# Patient Record
Sex: MALE | Race: WHITE | HISPANIC OR LATINO | Employment: FULL TIME | URBAN - METROPOLITAN AREA
[De-identification: names, ages, dates, MRNs, and addresses within clinical notes are randomized per-mention and may not be internally consistent; named-entity substitution may affect disease eponyms.]

---

## 2024-07-08 ENCOUNTER — HOSPITAL ENCOUNTER (INPATIENT)
Facility: HOSPITAL | Age: 29
LOS: 1 days | Discharge: HOME | DRG: 683 | End: 2024-07-09
Attending: HOSPITALIST | Admitting: HOSPITALIST

## 2024-07-08 DIAGNOSIS — M62.82 NON-TRAUMATIC RHABDOMYOLYSIS: ICD-10-CM

## 2024-07-08 DIAGNOSIS — N17.9 AKI (ACUTE KIDNEY INJURY): Primary | ICD-10-CM

## 2024-07-08 DIAGNOSIS — N17.9 ACUTE KIDNEY INJURY: ICD-10-CM

## 2024-07-08 DIAGNOSIS — R07.9 CHEST PAIN: ICD-10-CM

## 2024-07-08 LAB
ALBUMIN SERPL BCP-MCNC: 5.4 G/DL (ref 3.5–5.2)
ALP SERPL-CCNC: 208 U/L (ref 55–135)
ALT SERPL W/O P-5'-P-CCNC: 55 U/L (ref 10–44)
ANION GAP SERPL CALC-SCNC: 21 MMOL/L (ref 8–16)
AST SERPL-CCNC: 37 U/L (ref 10–40)
BASOPHILS # BLD AUTO: 0.1 K/UL (ref 0–0.2)
BASOPHILS NFR BLD: 0.5 % (ref 0–1.9)
BILIRUB SERPL-MCNC: 0.9 MG/DL (ref 0.1–1)
BUN SERPL-MCNC: 22 MG/DL (ref 6–20)
CALCIUM SERPL-MCNC: 11.3 MG/DL (ref 8.7–10.5)
CHLORIDE SERPL-SCNC: 99 MMOL/L (ref 95–110)
CK SERPL-CCNC: 529 U/L (ref 20–200)
CO2 SERPL-SCNC: 16 MMOL/L (ref 23–29)
CREAT SERPL-MCNC: 3.3 MG/DL (ref 0.5–1.4)
DIFFERENTIAL METHOD BLD: ABNORMAL
EOSINOPHIL # BLD AUTO: 0 K/UL (ref 0–0.5)
EOSINOPHIL NFR BLD: 0.1 % (ref 0–8)
ERYTHROCYTE [DISTWIDTH] IN BLOOD BY AUTOMATED COUNT: 13.2 % (ref 11.5–14.5)
EST. GFR  (NO RACE VARIABLE): 25 ML/MIN/1.73 M^2
GLUCOSE SERPL-MCNC: 115 MG/DL (ref 70–110)
HCT VFR BLD AUTO: 51.9 % (ref 40–54)
HCV AB SERPL QL IA: NEGATIVE
HEP C VIRUS HOLD SPECIMEN: NORMAL
HGB BLD-MCNC: 18 G/DL (ref 14–18)
HIV 1+2 AB+HIV1 P24 AG SERPL QL IA: NEGATIVE
IMM GRANULOCYTES # BLD AUTO: 0.13 K/UL (ref 0–0.04)
IMM GRANULOCYTES NFR BLD AUTO: 0.7 % (ref 0–0.5)
LYMPHOCYTES # BLD AUTO: 2.9 K/UL (ref 1–4.8)
LYMPHOCYTES NFR BLD: 14.9 % (ref 18–48)
MCH RBC QN AUTO: 27.2 PG (ref 27–31)
MCHC RBC AUTO-ENTMCNC: 34.7 G/DL (ref 32–36)
MCV RBC AUTO: 79 FL (ref 82–98)
MONOCYTES # BLD AUTO: 0.9 K/UL (ref 0.3–1)
MONOCYTES NFR BLD: 4.6 % (ref 4–15)
NEUTROPHILS # BLD AUTO: 15.5 K/UL (ref 1.8–7.7)
NEUTROPHILS NFR BLD: 79.2 % (ref 38–73)
NRBC BLD-RTO: 0 /100 WBC
PLATELET # BLD AUTO: 382 K/UL (ref 150–450)
PMV BLD AUTO: 9.2 FL (ref 9.2–12.9)
POTASSIUM SERPL-SCNC: 4.5 MMOL/L (ref 3.5–5.1)
PROT SERPL-MCNC: 10.3 G/DL (ref 6–8.4)
RBC # BLD AUTO: 6.61 M/UL (ref 4.6–6.2)
SODIUM SERPL-SCNC: 136 MMOL/L (ref 136–145)
WBC # BLD AUTO: 19.52 K/UL (ref 3.9–12.7)

## 2024-07-08 PROCEDURE — 11000001 HC ACUTE MED/SURG PRIVATE ROOM

## 2024-07-08 PROCEDURE — 96360 HYDRATION IV INFUSION INIT: CPT

## 2024-07-08 PROCEDURE — 25000003 PHARM REV CODE 250: Performed by: NURSE PRACTITIONER

## 2024-07-08 PROCEDURE — 85025 COMPLETE CBC W/AUTO DIFF WBC: CPT | Performed by: NURSE PRACTITIONER

## 2024-07-08 PROCEDURE — 86803 HEPATITIS C AB TEST: CPT | Performed by: EMERGENCY MEDICINE

## 2024-07-08 PROCEDURE — 99285 EMERGENCY DEPT VISIT HI MDM: CPT | Mod: 25

## 2024-07-08 PROCEDURE — 80053 COMPREHEN METABOLIC PANEL: CPT | Performed by: NURSE PRACTITIONER

## 2024-07-08 PROCEDURE — 87389 HIV-1 AG W/HIV-1&-2 AB AG IA: CPT | Performed by: EMERGENCY MEDICINE

## 2024-07-08 PROCEDURE — 82550 ASSAY OF CK (CPK): CPT | Performed by: NURSE PRACTITIONER

## 2024-07-08 RX ORDER — ALUMINUM HYDROXIDE, MAGNESIUM HYDROXIDE, AND SIMETHICONE 1200; 120; 1200 MG/30ML; MG/30ML; MG/30ML
30 SUSPENSION ORAL 4 TIMES DAILY PRN
Status: DISCONTINUED | OUTPATIENT
Start: 2024-07-09 | End: 2024-07-09 | Stop reason: HOSPADM

## 2024-07-08 RX ORDER — IBUPROFEN 200 MG
24 TABLET ORAL
Status: DISCONTINUED | OUTPATIENT
Start: 2024-07-09 | End: 2024-07-09 | Stop reason: HOSPADM

## 2024-07-08 RX ORDER — ONDANSETRON HYDROCHLORIDE 2 MG/ML
4 INJECTION, SOLUTION INTRAVENOUS
Status: ACTIVE | OUTPATIENT
Start: 2024-07-08 | End: 2024-07-09

## 2024-07-08 RX ORDER — IBUPROFEN 200 MG
16 TABLET ORAL
Status: DISCONTINUED | OUTPATIENT
Start: 2024-07-09 | End: 2024-07-09 | Stop reason: HOSPADM

## 2024-07-08 RX ORDER — ACETAMINOPHEN 650 MG/1
650 SUPPOSITORY RECTAL EVERY 4 HOURS PRN
Status: DISCONTINUED | OUTPATIENT
Start: 2024-07-09 | End: 2024-07-09 | Stop reason: HOSPADM

## 2024-07-08 RX ORDER — ONDANSETRON HYDROCHLORIDE 2 MG/ML
4 INJECTION, SOLUTION INTRAVENOUS EVERY 8 HOURS PRN
Status: DISCONTINUED | OUTPATIENT
Start: 2024-07-09 | End: 2024-07-09 | Stop reason: HOSPADM

## 2024-07-08 RX ORDER — GLUCAGON 1 MG
1 KIT INJECTION
Status: DISCONTINUED | OUTPATIENT
Start: 2024-07-09 | End: 2024-07-09 | Stop reason: HOSPADM

## 2024-07-08 RX ORDER — SODIUM CHLORIDE 9 MG/ML
INJECTION, SOLUTION INTRAVENOUS CONTINUOUS
Status: DISCONTINUED | OUTPATIENT
Start: 2024-07-08 | End: 2024-07-09 | Stop reason: HOSPADM

## 2024-07-08 RX ORDER — SODIUM CHLORIDE 0.9 % (FLUSH) 0.9 %
3 SYRINGE (ML) INJECTION EVERY 12 HOURS PRN
Status: DISCONTINUED | OUTPATIENT
Start: 2024-07-09 | End: 2024-07-09 | Stop reason: HOSPADM

## 2024-07-08 RX ORDER — ACETAMINOPHEN 325 MG/1
650 TABLET ORAL EVERY 8 HOURS PRN
Status: DISCONTINUED | OUTPATIENT
Start: 2024-07-09 | End: 2024-07-09 | Stop reason: HOSPADM

## 2024-07-08 RX ORDER — SIMETHICONE 80 MG
1 TABLET,CHEWABLE ORAL 4 TIMES DAILY PRN
Status: DISCONTINUED | OUTPATIENT
Start: 2024-07-09 | End: 2024-07-09 | Stop reason: HOSPADM

## 2024-07-08 RX ORDER — TALC
6 POWDER (GRAM) TOPICAL NIGHTLY PRN
Status: DISCONTINUED | OUTPATIENT
Start: 2024-07-09 | End: 2024-07-09 | Stop reason: HOSPADM

## 2024-07-08 RX ORDER — PROMETHAZINE HYDROCHLORIDE 25 MG/1
25 TABLET ORAL EVERY 6 HOURS PRN
Status: DISCONTINUED | OUTPATIENT
Start: 2024-07-09 | End: 2024-07-09 | Stop reason: HOSPADM

## 2024-07-08 RX ORDER — NALOXONE HCL 0.4 MG/ML
0.02 VIAL (ML) INJECTION
Status: DISCONTINUED | OUTPATIENT
Start: 2024-07-09 | End: 2024-07-09 | Stop reason: HOSPADM

## 2024-07-08 RX ADMIN — SODIUM CHLORIDE 2000 ML: 9 INJECTION, SOLUTION INTRAVENOUS at 10:07

## 2024-07-08 RX ADMIN — SODIUM CHLORIDE 1000 ML: 9 INJECTION, SOLUTION INTRAVENOUS at 09:07

## 2024-07-08 NOTE — Clinical Note
Diagnosis: Acute kidney injury [858645]   Future Attending Provider: MEGHANN BARRY [[id number]]   Reason for IP Medical Treatment  (Clinical interventions that can only be accomplished in the IP setting? ) :: iv fluids, labs   I certify that Inpatient services for greater than or equal to 2 midnights are medically necessary:: Yes   Plans for Post-Acute care--if anticipated (pick the single best option):: A. No post acute care anticipated at this time

## 2024-07-09 VITALS
DIASTOLIC BLOOD PRESSURE: 63 MMHG | OXYGEN SATURATION: 98 % | WEIGHT: 169.75 LBS | RESPIRATION RATE: 17 BRPM | SYSTOLIC BLOOD PRESSURE: 100 MMHG | HEART RATE: 76 BPM | TEMPERATURE: 98 F

## 2024-07-09 PROBLEM — R25.2 MUSCLE CRAMPS: Status: ACTIVE | Noted: 2024-07-09

## 2024-07-09 PROBLEM — N17.9 AKI (ACUTE KIDNEY INJURY): Status: ACTIVE | Noted: 2024-07-09

## 2024-07-09 PROBLEM — M62.82 NON-TRAUMATIC RHABDOMYOLYSIS: Status: ACTIVE | Noted: 2024-07-09

## 2024-07-09 PROBLEM — R74.8 ELEVATED CPK: Status: ACTIVE | Noted: 2024-07-09

## 2024-07-09 PROBLEM — T67.5XXA HEAT EXHAUSTION: Status: ACTIVE | Noted: 2024-07-09

## 2024-07-09 PROBLEM — D72.829 LEUKOCYTOSIS: Status: ACTIVE | Noted: 2024-07-09

## 2024-07-09 LAB
ALBUMIN SERPL BCP-MCNC: 3.3 G/DL (ref 3.5–5.2)
ALP SERPL-CCNC: 126 U/L (ref 55–135)
ALT SERPL W/O P-5'-P-CCNC: 33 U/L (ref 10–44)
ANION GAP SERPL CALC-SCNC: 11 MMOL/L (ref 8–16)
AST SERPL-CCNC: 25 U/L (ref 10–40)
BASOPHILS # BLD AUTO: 0.05 K/UL (ref 0–0.2)
BASOPHILS NFR BLD: 0.5 % (ref 0–1.9)
BILIRUB SERPL-MCNC: 0.8 MG/DL (ref 0.1–1)
BUN SERPL-MCNC: 22 MG/DL (ref 6–20)
CALCIUM SERPL-MCNC: 8.3 MG/DL (ref 8.7–10.5)
CHLORIDE SERPL-SCNC: 110 MMOL/L (ref 95–110)
CO2 SERPL-SCNC: 18 MMOL/L (ref 23–29)
CREAT SERPL-MCNC: 1.3 MG/DL (ref 0.5–1.4)
DIFFERENTIAL METHOD BLD: ABNORMAL
EOSINOPHIL # BLD AUTO: 0.1 K/UL (ref 0–0.5)
EOSINOPHIL NFR BLD: 0.6 % (ref 0–8)
ERYTHROCYTE [DISTWIDTH] IN BLOOD BY AUTOMATED COUNT: 12.9 % (ref 11.5–14.5)
EST. GFR  (NO RACE VARIABLE): >60 ML/MIN/1.73 M^2
GLUCOSE SERPL-MCNC: 102 MG/DL (ref 70–110)
HCT VFR BLD AUTO: 40.1 % (ref 40–54)
HGB BLD-MCNC: 13.5 G/DL (ref 14–18)
IMM GRANULOCYTES # BLD AUTO: 0.05 K/UL (ref 0–0.04)
IMM GRANULOCYTES NFR BLD AUTO: 0.5 % (ref 0–0.5)
LYMPHOCYTES # BLD AUTO: 3.1 K/UL (ref 1–4.8)
LYMPHOCYTES NFR BLD: 28.7 % (ref 18–48)
MAGNESIUM SERPL-MCNC: 2.4 MG/DL (ref 1.6–2.6)
MCH RBC QN AUTO: 27.6 PG (ref 27–31)
MCHC RBC AUTO-ENTMCNC: 33.7 G/DL (ref 32–36)
MCV RBC AUTO: 82 FL (ref 82–98)
MONOCYTES # BLD AUTO: 0.9 K/UL (ref 0.3–1)
MONOCYTES NFR BLD: 8.5 % (ref 4–15)
NEUTROPHILS # BLD AUTO: 6.5 K/UL (ref 1.8–7.7)
NEUTROPHILS NFR BLD: 61.2 % (ref 38–73)
NRBC BLD-RTO: 0 /100 WBC
PLATELET # BLD AUTO: 262 K/UL (ref 150–450)
PMV BLD AUTO: 9.4 FL (ref 9.2–12.9)
POTASSIUM SERPL-SCNC: 3.7 MMOL/L (ref 3.5–5.1)
PROT SERPL-MCNC: 6.1 G/DL (ref 6–8.4)
RBC # BLD AUTO: 4.9 M/UL (ref 4.6–6.2)
SODIUM SERPL-SCNC: 139 MMOL/L (ref 136–145)
WBC # BLD AUTO: 10.62 K/UL (ref 3.9–12.7)

## 2024-07-09 PROCEDURE — 80053 COMPREHEN METABOLIC PANEL: CPT | Performed by: NURSE PRACTITIONER

## 2024-07-09 PROCEDURE — 25000003 PHARM REV CODE 250: Performed by: NURSE PRACTITIONER

## 2024-07-09 PROCEDURE — 36415 COLL VENOUS BLD VENIPUNCTURE: CPT | Performed by: NURSE PRACTITIONER

## 2024-07-09 PROCEDURE — 83735 ASSAY OF MAGNESIUM: CPT | Performed by: HOSPITALIST

## 2024-07-09 PROCEDURE — 85025 COMPLETE CBC W/AUTO DIFF WBC: CPT | Performed by: NURSE PRACTITIONER

## 2024-07-09 RX ORDER — IBUPROFEN 600 MG/1
600 TABLET ORAL EVERY 8 HOURS PRN
Status: ON HOLD | COMMUNITY
End: 2024-07-09 | Stop reason: HOSPADM

## 2024-07-09 RX ADMIN — SODIUM CHLORIDE: 9 INJECTION, SOLUTION INTRAVENOUS at 12:07

## 2024-07-09 RX ADMIN — SODIUM CHLORIDE: 9 INJECTION, SOLUTION INTRAVENOUS at 07:07

## 2024-07-09 NOTE — ASSESSMENT & PLAN NOTE
Patient presented with diffuse body aches/cramping following working outdoors and found to have evidence concerning for dehydration and rhabdomyolysis given presence of YOGI with Cr/GFR measuring 3.3/25 and elevated .  Patient initiated on IVFs with repeat a.m. labs pending. Will continue to monitor.  Plan:  -continue IVFs  -trend CPK level  -monitor and replete electrolytes as needed

## 2024-07-09 NOTE — PLAN OF CARE
[facility]  Discharge Final Note    Primary Care Provider: Lashon Primary Doctor    Expected Discharge Date: 7/9/2024    Final Discharge Note (most recent)       Final Note - 07/09/24 0934          Final Note    Assessment Type Final Discharge Note     Anticipated Discharge Disposition Home or Self Care     Hospital Resources/Appts/Education Provided --   [facility] referral [facility] to [facility]                             Contact Info       No, Primary Doctor   Relationship: PCP - General        Next Steps: Follow up in 3 day(s)

## 2024-07-09 NOTE — NURSING
Discharge instructions in Thai, written and verbal, given to patient an significant other. Teach back complete.

## 2024-07-09 NOTE — H&P
[facility] Medicine  History & Physical    Patient Name: Yousuf aT  MRN: [id number]  Patient Class: IP- Inpatient  Admission Date: 7/8/2024  Attending Physician: Willy Reyes MD   Primary Care Provider: Lashon Primary Doctor         Patient information was obtained from patient, past medical records, and ER records.     Subjective:     Principal Problem:Muscle cramps    Chief Complaint:   Chief Complaint   Patient presents with    Cramping     Body cramps after working in the heat        HPI: Yousuf Ta is a 28 y.o. male with a PMH  has no past medical history on file. who presented to the ED for worsening generalized body aches/weakness and muscles cramps x1 day duration.  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  Patient does report feeling dehydrated and sweating a lot and reported taking no prescribed or over-the-counter medications prior to arrival.  He denied exposure to recent ill contacts, any significant past medical history, and reported being in his usual state of health prior to onset of symptoms.  All other review of systems negative except as noted above.  Initial workup in the ED revealed patient to be afebrile with leukocytosis measuring 19.52, bicarb 16, anion gap 21, Cr/GFR 3.3/25, and  concerning for rhabdomyolysis and dehydration.  Patient admitted to Hospital Medicine inpatient for continued medical management.    PCP: Lashon, Primary Doctor      No past medical history on file.    No past surgical history on file.    Review of patient's allergies indicates:  No Known Allergies    No current facility-administered medications on file prior to encounter.     No current outpatient medications on file prior to encounter.     Family History    None       Tobacco Use    Smoking status: Not on file    Smokeless tobacco: Not on file   Substance and Sexual  Activity    Alcohol use: Not on file    Drug use: Not on file    Sexual activity: Not on file     Review of Systems   All other systems reviewed and are negative.    Objective:     Vital Signs (Most Recent):  Temp: 98.4 °F (36.9 °C) (07/09/24 0132)  Pulse: 97 (07/09/24 0214)  Resp: 20 (07/09/24 0132)  BP: (!) 130/90 (07/09/24 0132)  SpO2: 100 % (07/09/24 0132) Vital Signs (24h Range):  Temp:  [97.7 °F (36.5 °C)-98.4 °F (36.9 °C)] 98.4 °F (36.9 °C)  Pulse:  [] 97  Resp:  [18-20] 20  SpO2:  [95 %-100 %] 100 %  BP: (117-139)/(58-90) 130/90     Weight: 77 kg (169 lb 12.1 oz)  There is no height or weight on file to calculate BMI.     Physical Exam  Vitals reviewed.   Constitutional:       General: He is not in acute distress.     Appearance: He is obese. He is ill-appearing. He is not toxic-appearing or diaphoretic.      Comments: Ill-appearing but nontoxic   HENT:      Head: Normocephalic and atraumatic.      Right Ear: External ear normal.      Left Ear: External ear normal.      Nose: Nose normal. No congestion or rhinorrhea.      Mouth/Throat:      Mouth: Mucous membranes are moist.      Pharynx: Oropharynx is clear. No oropharyngeal exudate or posterior oropharyngeal erythema.   Eyes:      General: No scleral icterus.     Extraocular Movements: Extraocular movements intact.      Conjunctiva/sclera: Conjunctivae normal.      Pupils: Pupils are equal, round, and reactive to light.   Neck:      Vascular: No carotid bruit.   Cardiovascular:      Rate and Rhythm: Normal rate and regular rhythm.      Pulses: Normal pulses.      Heart sounds: Normal heart sounds. No murmur heard.     No friction rub. No gallop.   Pulmonary:      Effort: Pulmonary effort is normal. No respiratory distress.      Breath sounds: Normal breath sounds. No stridor. No wheezing, rhonchi or rales.   Chest:      Chest wall: No tenderness.   Abdominal:      General: Abdomen is flat. Bowel sounds are normal. There is no distension.       Palpations: Abdomen is soft. There is no mass.      Tenderness: There is no abdominal tenderness. There is no guarding or rebound.      Hernia: No hernia is present.   Musculoskeletal:         General: No swelling, tenderness, deformity or signs of injury. Normal range of motion.      Cervical back: Normal range of motion and neck supple. No rigidity or tenderness.   Lymphadenopathy:      Cervical: No cervical adenopathy.   Skin:     General: Skin is warm and dry.      Capillary Refill: Capillary refill takes less than 2 seconds.      Coloration: Skin is not jaundiced or pale.      Findings: No bruising, erythema, lesion or rash.   Neurological:      General: No focal deficit present.      Mental Status: He is alert and oriented to person, place, and time. Mental status is at baseline.      Cranial Nerves: No cranial nerve deficit.      Sensory: No sensory deficit.      Motor: No weakness.      Coordination: Coordination normal.   Psychiatric:         Mood and Affect: Mood normal.         Behavior: Behavior normal.         Thought Content: Thought content normal.         Judgment: Judgment normal.              CRANIAL NERVES     CN III, IV, VI   Pupils are equal, round, and reactive to light.       Significant Labs: All pertinent labs within the past 24 hours have been reviewed.    Significant Imaging: I have reviewed all pertinent imaging results/findings within the past 24 hours.    LABS:  Recent Results (from the past 24 hour(s))   HIV 1/2 Ag/Ab (4th Gen)    Collection Time: 07/08/24  9:33 PM   Result Value Ref Range    HIV 1/2 Ag/Ab Negative Negative   Hepatitis C Antibody    Collection Time: 07/08/24  9:33 PM   Result Value Ref Range    Hepatitis C Ab Negative Negative   HCV Virus Hold Specimen    Collection Time: 07/08/24  9:33 PM   Result Value Ref Range    HEP C Virus Hold Specimen Hold for HCV sendout    CBC auto differential    Collection Time: 07/08/24  9:33 PM   Result Value Ref Range    WBC 19.52 (H)  3.90 - 12.70 K/uL    RBC 6.61 (H) 4.60 - 6.20 M/uL    Hemoglobin 18.0 14.0 - 18.0 g/dL    Hematocrit 51.9 40.0 - 54.0 %    MCV 79 (L) 82 - 98 fL    MCH 27.2 27.0 - 31.0 pg    MCHC 34.7 32.0 - 36.0 g/dL    RDW 13.2 11.5 - 14.5 %    Platelets 382 150 - 450 K/uL    MPV 9.2 9.2 - 12.9 fL    Immature Granulocytes 0.7 (H) 0.0 - 0.5 %    Gran # (ANC) 15.5 (H) 1.8 - 7.7 K/uL    Immature Grans (Abs) 0.13 (H) 0.00 - 0.04 K/uL    Lymph # 2.9 1.0 - 4.8 K/uL    Mono # 0.9 0.3 - 1.0 K/uL    Eos # 0.0 0.0 - 0.5 K/uL    Baso # 0.10 0.00 - 0.20 K/uL    nRBC 0 0 /100 WBC    Gran % 79.2 (H) 38.0 - 73.0 %    Lymph % 14.9 (L) 18.0 - 48.0 %    Mono % 4.6 4.0 - 15.0 %    Eosinophil % 0.1 0.0 - 8.0 %    Basophil % 0.5 0.0 - 1.9 %    Differential Method Automated    Comprehensive metabolic panel    Collection Time: 07/08/24  9:33 PM   Result Value Ref Range    Sodium 136 136 - 145 mmol/L    Potassium 4.5 3.5 - 5.1 mmol/L    Chloride 99 95 - 110 mmol/L    CO2 16 (L) 23 - 29 mmol/L    Glucose 115 (H) 70 - 110 mg/dL    BUN 22 (H) 6 - 20 mg/dL    Creatinine 3.3 (H) 0.5 - 1.4 mg/dL    Calcium 11.3 (H) 8.7 - 10.5 mg/dL    Total Protein 10.3 (H) 6.0 - 8.4 g/dL    Albumin 5.4 (H) 3.5 - 5.2 g/dL    Total Bilirubin 0.9 0.1 - 1.0 mg/dL    Alkaline Phosphatase 208 (H) 55 - 135 U/L    AST 37 10 - 40 U/L    ALT 55 (H) 10 - 44 U/L    eGFR 25 (A) >60 mL/min/1.73 m^2    Anion Gap 21 (H) 8 - 16 mmol/L   CPK    Collection Time: 07/08/24  9:33 PM   Result Value Ref Range     (H) 20 - 200 U/L       RADIOLOGY  No results found.    EKG    MICROBIOLOGY    MDM    Assessment/Plan:     * Muscle cramps    Heat exhaustion    Non-traumatic rhabdomyolysis    Elevated CPK  Patient presented with diffuse body aches/cramping following working outdoors and found to have evidence concerning for dehydration and rhabdomyolysis given presence of YOGI with Cr/GFR measuring 3.3/25 and elevated .  Patient initiated on IVFs with repeat a.m. labs pending. Will continue  to monitor.  Plan:  -continue IVFs  -trend CPK level  -monitor and replete electrolytes as needed      YOGI (acute kidney injury)  Patient with acute kidney injury/acute renal failure likely due to pre-renal azotemia due to dehydration YOGI is currently  undergoing medical management . Baseline creatinine unknown - Labs reviewed- Renal function/electrolytes with CrCl cannot be calculated (Unknown ideal weight.). according to latest data. Monitor urine output and serial BMP and adjust therapy as needed. Avoid nephrotoxins and renally dose meds for GFR listed above.      Leukocytosis  Patient found to have elevated WBC measuring 19.52 in absence of active infectious processes and likely reactive in nature given underlying dehydration/rhabdomyolysis as noted above.  Patient remains afebrile in initiated on IVFs.  Plan:  -continue IVFs  -monitor CBC and fever curve  -will hold antibiotics for time being        VTE Risk Mitigation (From admission, onward)           Ordered     IP VTE LOW RISK PATIENT  Once         07/08/24 2305     Place sequential compression device  Until discontinued         07/08/24 2305                  //Core Measures   -DVT proph: SCDs, Lovenox   -Code status: Full    -Surrogate: none provided       Components of this note were documented using a voice recognition system and are subject to errors not corrected at the time the document was proof read. Please contact the author for any clarifications.        Willy Reyes MD  Department of Hospital Medicine  [facility]

## 2024-07-09 NOTE — SUBJECTIVE & OBJECTIVE
No past medical history on file.    No past surgical history on file.    Review of patient's allergies indicates:  No Known Allergies    No current facility-administered medications on file prior to encounter.     No current outpatient medications on file prior to encounter.     Family History    None       Tobacco Use    Smoking status: Not on file    Smokeless tobacco: Not on file   Substance and Sexual Activity    Alcohol use: Not on file    Drug use: Not on file    Sexual activity: Not on file     Review of Systems   All other systems reviewed and are negative.    Objective:     Vital Signs (Most Recent):  Temp: 98.4 °F (36.9 °C) (07/09/24 0132)  Pulse: 97 (07/09/24 0214)  Resp: 20 (07/09/24 0132)  BP: (!) 130/90 (07/09/24 0132)  SpO2: 100 % (07/09/24 0132) Vital Signs (24h Range):  Temp:  [97.7 °F (36.5 °C)-98.4 °F (36.9 °C)] 98.4 °F (36.9 °C)  Pulse:  [] 97  Resp:  [18-20] 20  SpO2:  [95 %-100 %] 100 %  BP: (117-139)/(58-90) 130/90     Weight: 77 kg (169 lb 12.1 oz)  There is no height or weight on file to calculate BMI.     Physical Exam  Vitals reviewed.   Constitutional:       General: He is not in acute distress.     Appearance: He is obese. He is ill-appearing. He is not toxic-appearing or diaphoretic.      Comments: Ill-appearing but nontoxic   HENT:      Head: Normocephalic and atraumatic.      Right Ear: External ear normal.      Left Ear: External ear normal.      Nose: Nose normal. No congestion or rhinorrhea.      Mouth/Throat:      Mouth: Mucous membranes are moist.      Pharynx: Oropharynx is clear. No oropharyngeal exudate or posterior oropharyngeal erythema.   Eyes:      General: No scleral icterus.     Extraocular Movements: Extraocular movements intact.      Conjunctiva/sclera: Conjunctivae normal.      Pupils: Pupils are equal, round, and reactive to light.   Neck:      Vascular: No carotid bruit.   Cardiovascular:      Rate and Rhythm: Normal rate and regular rhythm.      Pulses:  Normal pulses.      Heart sounds: Normal heart sounds. No murmur heard.     No friction rub. No gallop.   Pulmonary:      Effort: Pulmonary effort is normal. No respiratory distress.      Breath sounds: Normal breath sounds. No stridor. No wheezing, rhonchi or rales.   Chest:      Chest wall: No tenderness.   Abdominal:      General: Abdomen is flat. Bowel sounds are normal. There is no distension.      Palpations: Abdomen is soft. There is no mass.      Tenderness: There is no abdominal tenderness. There is no guarding or rebound.      Hernia: No hernia is present.   Musculoskeletal:         General: No swelling, tenderness, deformity or signs of injury. Normal range of motion.      Cervical back: Normal range of motion and neck supple. No rigidity or tenderness.   Lymphadenopathy:      Cervical: No cervical adenopathy.   Skin:     General: Skin is warm and dry.      Capillary Refill: Capillary refill takes less than 2 seconds.      Coloration: Skin is not jaundiced or pale.      Findings: No bruising, erythema, lesion or rash.   Neurological:      General: No focal deficit present.      Mental Status: He is alert and oriented to person, place, and time. Mental status is at baseline.      Cranial Nerves: No cranial nerve deficit.      Sensory: No sensory deficit.      Motor: No weakness.      Coordination: Coordination normal.   Psychiatric:         Mood and Affect: Mood normal.         Behavior: Behavior normal.         Thought Content: Thought content normal.         Judgment: Judgment normal.              CRANIAL NERVES     CN III, IV, VI   Pupils are equal, round, and reactive to light.       Significant Labs: All pertinent labs within the past 24 hours have been reviewed.    Significant Imaging: I have reviewed all pertinent imaging results/findings within the past 24 hours.    LABS:  Recent Results (from the past 24 hour(s))   HIV 1/2 Ag/Ab (4th Gen)    Collection Time: 07/08/24  9:33 PM   Result Value Ref  Range    HIV 1/2 Ag/Ab Negative Negative   Hepatitis C Antibody    Collection Time: 07/08/24  9:33 PM   Result Value Ref Range    Hepatitis C Ab Negative Negative   HCV Virus Hold Specimen    Collection Time: 07/08/24  9:33 PM   Result Value Ref Range    HEP C Virus Hold Specimen Hold for HCV sendout    CBC auto differential    Collection Time: 07/08/24  9:33 PM   Result Value Ref Range    WBC 19.52 (H) 3.90 - 12.70 K/uL    RBC 6.61 (H) 4.60 - 6.20 M/uL    Hemoglobin 18.0 14.0 - 18.0 g/dL    Hematocrit 51.9 40.0 - 54.0 %    MCV 79 (L) 82 - 98 fL    MCH 27.2 27.0 - 31.0 pg    MCHC 34.7 32.0 - 36.0 g/dL    RDW 13.2 11.5 - 14.5 %    Platelets 382 150 - 450 K/uL    MPV 9.2 9.2 - 12.9 fL    Immature Granulocytes 0.7 (H) 0.0 - 0.5 %    Gran # (ANC) 15.5 (H) 1.8 - 7.7 K/uL    Immature Grans (Abs) 0.13 (H) 0.00 - 0.04 K/uL    Lymph # 2.9 1.0 - 4.8 K/uL    Mono # 0.9 0.3 - 1.0 K/uL    Eos # 0.0 0.0 - 0.5 K/uL    Baso # 0.10 0.00 - 0.20 K/uL    nRBC 0 0 /100 WBC    Gran % 79.2 (H) 38.0 - 73.0 %    Lymph % 14.9 (L) 18.0 - 48.0 %    Mono % 4.6 4.0 - 15.0 %    Eosinophil % 0.1 0.0 - 8.0 %    Basophil % 0.5 0.0 - 1.9 %    Differential Method Automated    Comprehensive metabolic panel    Collection Time: 07/08/24  9:33 PM   Result Value Ref Range    Sodium 136 136 - 145 mmol/L    Potassium 4.5 3.5 - 5.1 mmol/L    Chloride 99 95 - 110 mmol/L    CO2 16 (L) 23 - 29 mmol/L    Glucose 115 (H) 70 - 110 mg/dL    BUN 22 (H) 6 - 20 mg/dL    Creatinine 3.3 (H) 0.5 - 1.4 mg/dL    Calcium 11.3 (H) 8.7 - 10.5 mg/dL    Total Protein 10.3 (H) 6.0 - 8.4 g/dL    Albumin 5.4 (H) 3.5 - 5.2 g/dL    Total Bilirubin 0.9 0.1 - 1.0 mg/dL    Alkaline Phosphatase 208 (H) 55 - 135 U/L    AST 37 10 - 40 U/L    ALT 55 (H) 10 - 44 U/L    eGFR 25 (A) >60 mL/min/1.73 m^2    Anion Gap 21 (H) 8 - 16 mmol/L   CPK    Collection Time: 07/08/24  9:33 PM   Result Value Ref Range     (H) 20 - 200 U/L       RADIOLOGY  No results  found.    EKG    MICROBIOLOGY    MDM

## 2024-07-09 NOTE — ASSESSMENT & PLAN NOTE
Patient found to have elevated WBC measuring 19.52 in absence of active infectious processes and likely reactive in nature given underlying dehydration/rhabdomyolysis as noted above.  Patient remains afebrile in initiated on IVFs.  Plan:  -continue IVFs  -monitor CBC and fever curve  -will hold antibiotics for time being

## 2024-07-09 NOTE — ED PROVIDER NOTES
Encounter Date: 7/8/2024       History     Chief Complaint   Patient presents with    Cramping     Body cramps after working in the heat     Patient is a 28-year-old male who presents with complaints of generalized body cramps, weakness.  Onset of symptoms was today.  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  No medications taken for relief of symptoms prior to arrival.      Review of patient's allergies indicates:  No Known Allergies  No past medical history on file.  No past surgical history on file.  No family history on file.     Review of Systems   Constitutional:  Negative for fever.   HENT:  Negative for sore throat.    Respiratory:  Negative for shortness of breath.    Cardiovascular:  Negative for chest pain.   Gastrointestinal:  Negative for nausea.   Genitourinary:  Negative for dysuria.   Musculoskeletal:  Positive for myalgias. Negative for back pain.   Skin:  Negative for rash.   Neurological:  Positive for dizziness and weakness.   Hematological:  Does not bruise/bleed easily.       Physical Exam     Initial Vitals [07/08/24 1954]   BP Pulse Resp Temp SpO2   124/88 (!) 127 20 97.7 °F (36.5 °C) 97 %      MAP       --         Physical Exam    Nursing note and vitals reviewed.  Constitutional: He appears well-developed and well-nourished. He appears ill (patient appears to be in pain due to the cramping).   HENT:   Head: Normocephalic and atraumatic.   Eyes: Conjunctivae and EOM are normal. Pupils are equal, round, and reactive to light.   Neck: Neck supple.   Normal range of motion.  Cardiovascular:  Regular rhythm, normal heart sounds and intact distal pulses.   Tachycardia present.         Pulmonary/Chest: Breath sounds normal.   Abdominal: Abdomen is soft. Bowel sounds are normal.   Musculoskeletal:         General: Normal range of motion.      Cervical back: Normal range of motion and neck supple.     Neurological: He is alert and oriented to person, place,  and time. He has normal strength and normal reflexes.   Skin: Skin is warm and dry. Capillary refill takes less than 2 seconds.   Psychiatric: He has a normal mood and affect. His behavior is normal. Judgment and thought content normal.         ED Course   Procedures  Labs Reviewed   CBC W/ AUTO DIFFERENTIAL - Abnormal; Notable for the following components:       Result Value    WBC 19.52 (*)     RBC 6.61 (*)     MCV 79 (*)     Immature Granulocytes 0.7 (*)     Gran # (ANC) 15.5 (*)     Immature Grans (Abs) 0.13 (*)     Gran % 79.2 (*)     Lymph % 14.9 (*)     All other components within normal limits   COMPREHENSIVE METABOLIC PANEL - Abnormal; Notable for the following components:    CO2 16 (*)     Glucose 115 (*)     BUN 22 (*)     Creatinine 3.3 (*)     Calcium 11.3 (*)     Total Protein 10.3 (*)     Albumin 5.4 (*)     Alkaline Phosphatase 208 (*)     ALT 55 (*)     eGFR 25 (*)     Anion Gap 21 (*)     All other components within normal limits   CK - Abnormal; Notable for the following components:     (*)     All other components within normal limits   HEPATITIS C ANTIBODY    Narrative:     Release to patient->Immediate   HEP C VIRUS HOLD SPECIMEN    Narrative:     Release to patient->Immediate   HIV 1 / 2 ANTIBODY          Imaging Results    None          Medications   ondansetron injection 4 mg (4 mg Intravenous Not Given 7/8/24 2115)   sodium chloride 0.9% bolus 2,000 mL 2,000 mL (2,000 mLs Intravenous New Bag 7/8/24 2226)   sodium chloride 0.9% bolus 1,000 mL 1,000 mL (0 mLs Intravenous Stopped 7/8/24 2220)     Medical Decision Making  Patient informed of lab results.  Informed of need for admission to hospital for IV fluids.  Patient agrees to plan.    Amount and/or Complexity of Data Reviewed  Labs: ordered.    Risk  Prescription drug management.  Decision regarding hospitalization.                                      Clinical Impression:  Final diagnoses:  [N17.9] Acute kidney injury          ED  Disposition Condition    Admit Stable                Juan Presley, NP  07/08/24 2230

## 2024-07-09 NOTE — ASSESSMENT & PLAN NOTE
Patient with acute kidney injury/acute renal failure likely due to pre-renal azotemia due to dehydration YOGI is currently  undergoing medical management . Baseline creatinine unknown - Labs reviewed- Renal function/electrolytes with CrCl cannot be calculated (Unknown ideal weight.). according to latest data. Monitor urine output and serial BMP and adjust therapy as needed. Avoid nephrotoxins and renally dose meds for GFR listed above.

## 2024-07-09 NOTE — HPI
Yousuf Ta is a 28 y.o. male with a PMH  has no past medical history on file. who presented to the ED for worsening generalized body aches/weakness and muscles cramps x1 day duration.  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  Patient does report feeling dehydrated and sweating a lot and reported taking no prescribed or over-the-counter medications prior to arrival.  He denied exposure to recent ill contacts, any significant past medical history, and reported being in his usual state of health prior to onset of symptoms.  All other review of systems negative except as noted above.  Initial workup in the ED revealed patient to be afebrile with leukocytosis measuring 19.52, bicarb 16, anion gap 21, Cr/GFR 3.3/25, and  concerning for rhabdomyolysis and dehydration.  Patient admitted to Hospital Medicine inpatient for continued medical management.    PCP: No, Primary Doctor

## 2024-07-10 NOTE — DISCHARGE SUMMARY
[facility] Medicine  Discharge Summary      Patient Name: Yousuf Ta  MRN: [id number]  SHANNAN: [id number]  Patient Class: IP- Inpatient  Admission Date: 7/8/2024  Hospital Length of Stay: 1 days  Discharge Date and Time: 7/9/2024 12:10 PM  Attending Physician: Dr. Duckworth  Discharging Provider: Vale Ann NP  Primary Care Provider: [facility] Primary [facility]    Primary Care Team: Networked reference to record PCT     HPI:   Yousuf Ta is a 28 y.o. male with a PMH  has no past medical history on file. who presented to the ED for worsening generalized body aches/weakness and muscles cramps x1 day duration.  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  Patient does report feeling dehydrated and sweating a lot and reported taking no prescribed or over-the-counter medications prior to arrival.  He denied exposure to recent ill contacts, any significant past medical history, and reported being in his usual state of health prior to onset of symptoms.  All other review of systems negative except as noted above.  Initial workup in the ED revealed patient to be afebrile with leukocytosis measuring 19.52, bicarb 16, anion gap 21, Cr/GFR 3.3/25, and  concerning for rhabdomyolysis and dehydration.  Patient admitted to Hospital Medicine inpatient for continued medical management.    PCP: [facility] Primary [facility]          Hospital Course:   The patient is a 27 yo  male who was admitted for Non traumatic rhabdomyolysis, Heat exhaustion, leukocytosis, and YOGI with associated muscle cramps on aggressive IVFs. Overnight, Leukocytosis and YOGI resolved. No s/s infection. Exam using language line. Muscle cramps and pain resolved. Pt feels at baseline. Encouraged to stay out of heat and drink >2 liters of fluids daily over next 2-3 days. Counseled dehydration prevention starts several days prior to being  outside in heat. Counseled to avoid caffeine, alcohol, and energy drinks. Pt verbalized understanding. F/U [facility] PCP.          Goals of Care Treatment Preferences:  Code Status: Full Code      Consults:   Consults (From admission, onward)          Status Ordering Provider     Inpatient consult to Social Work  Once        Provider:  (Not yet assigned)    Completed VALE LOPEZ              Final Active Diagnoses:    Diagnosis Date Noted POA    PRINCIPAL PROBLEM:  YOGI (acute kidney injury) [N17.9] 07/09/2024 Yes    Non-traumatic rhabdomyolysis [M62.82] 07/09/2024 Yes    Heat exhaustion [T67.5XXA] 07/09/2024 Yes    Leukocytosis [D72.829] 07/09/2024 Yes    Muscle cramps [R25.2] 07/09/2024 Yes      Problems Resolved During this Admission:       Discharged Condition: stable    Disposition: Home or Self Care    Follow Up:   Follow-up Information       No, Primary Doctor Follow up in 3 day(s).                           Patient Instructions:      Diet Adult Regular     Activity as tolerated       Significant Diagnostic Studie   Imaging Results    None          Pending Diagnostic Studies:       None           Medications:  Reconciled Home Medications:      Medication List        STOP taking these medications      ibuprofen 600 MG tablet  Commonly known as: ADVIL,MOTRIN              Indwelling Lines/Drains at time of discharge:   Lines/Drains/Airways       None                   Time spent on the discharge of patient: 45 minutes         Vale Lpoez NP  Department of Hospital Medicine  [facility] - Med Surg

## 2024-07-10 NOTE — HOSPITAL COURSE
The patient is a 29 yo  male who was admitted for Non traumatic rhabdomyolysis, Heat exhaustion, leukocytosis, and YOGI with associated muscle cramps on aggressive IVFs. Overnight, Leukocytosis and YOGI resolved. No s/s infection. Exam using language line. Muscle cramps and pain resolved. Pt feels at baseline. Encouraged to stay out of heat and drink >2 liters of fluids daily over next 2-3 days. Counseled dehydration prevention starts several days prior to being outside in heat. Counseled to avoid caffeine, alcohol, and energy drinks. Pt verbalized understanding. F/U [facility] PCP.